# Patient Record
Sex: MALE | URBAN - METROPOLITAN AREA
[De-identification: names, ages, dates, MRNs, and addresses within clinical notes are randomized per-mention and may not be internally consistent; named-entity substitution may affect disease eponyms.]

---

## 2022-02-03 ENCOUNTER — OFFICE VISIT (OUTPATIENT)
Dept: URBAN - METROPOLITAN AREA CLINIC 6 | Facility: CLINIC | Age: 57
End: 2022-02-03
Payer: COMMERCIAL

## 2022-02-03 DIAGNOSIS — H04.322: Primary | ICD-10-CM

## 2022-02-03 PROCEDURE — 92002 INTRM OPH EXAM NEW PATIENT: CPT | Performed by: OPTOMETRIST

## 2022-02-03 ASSESSMENT — INTRAOCULAR PRESSURE
OS: 11
OD: 14

## 2022-02-03 NOTE — IMPRESSION/PLAN
Impression: Acute dacryopericystitis of left lacrimal passage: O85.584. Plan: Emergency visit for Acute dacryopericystitis of left lacrimal passage- continue Clindamycin PO as Rx by an outside doctor. Continue warm compresses.